# Patient Record
Sex: FEMALE | Race: WHITE | HISPANIC OR LATINO
[De-identification: names, ages, dates, MRNs, and addresses within clinical notes are randomized per-mention and may not be internally consistent; named-entity substitution may affect disease eponyms.]

---

## 2024-02-19 ENCOUNTER — NON-APPOINTMENT (OUTPATIENT)
Age: 69
End: 2024-02-19

## 2024-02-20 ENCOUNTER — APPOINTMENT (OUTPATIENT)
Dept: OTOLARYNGOLOGY | Facility: CLINIC | Age: 69
End: 2024-02-20
Payer: MEDICARE

## 2024-02-20 VITALS
HEART RATE: 66 BPM | BODY MASS INDEX: 27.31 KG/M2 | SYSTOLIC BLOOD PRESSURE: 132 MMHG | DIASTOLIC BLOOD PRESSURE: 91 MMHG | HEIGHT: 64 IN | WEIGHT: 160 LBS | OXYGEN SATURATION: 98 %

## 2024-02-20 DIAGNOSIS — Z78.9 OTHER SPECIFIED HEALTH STATUS: ICD-10-CM

## 2024-02-20 DIAGNOSIS — J34.89 OTHER SPECIFIED DISORDERS OF NOSE AND NASAL SINUSES: ICD-10-CM

## 2024-02-20 PROBLEM — Z00.00 ENCOUNTER FOR PREVENTIVE HEALTH EXAMINATION: Status: ACTIVE | Noted: 2024-02-20

## 2024-02-20 PROCEDURE — 99203 OFFICE O/P NEW LOW 30 MIN: CPT | Mod: 25

## 2024-02-20 PROCEDURE — 31231 NASAL ENDOSCOPY DX: CPT

## 2024-02-20 RX ORDER — ROSUVASTATIN CALCIUM 5 MG/1
5 TABLET, FILM COATED ORAL
Refills: 0 | Status: ACTIVE | COMMUNITY

## 2024-02-20 RX ORDER — CAMPHOR, MENTHOL, WHITE PETROLATUM 1; .54; 97.92 G/100G; G/100G; G/100G
1-0.54-97.92 OINTMENT TOPICAL
Qty: 1 | Refills: 5 | Status: ACTIVE | COMMUNITY
Start: 2024-02-20 | End: 1900-01-01

## 2024-02-20 RX ORDER — CYCLOSPORINE 0.5 MG/ML
EMULSION OPHTHALMIC
Refills: 0 | Status: ACTIVE | COMMUNITY

## 2024-02-20 NOTE — ASSESSMENT
[FreeTextEntry1] : 68F here for initial evaluation. For the past 3 months she c/o feeling discomfort and 'irritation' with 'burning' inside her nostrils. She also feels a growth in the right side which she can pick out w her fingers until it recurs a few days later. There is no pain or bleeding, no drainage or foul odor. Sx are worse in the cold air. Of note, she had rhinoplasty 40yrs ago and has congestion at baseline. She is not on any nasal sprays or ointments. On exam, she is s/p rhinoplasty w narrow external nasal valves. 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 Sx likely due to local nasal vestibulitis. Optimize nasal hygiene w humidification. Can try saline sprays and boroleum ointment. RTO should sx persist/worsen.

## 2024-02-20 NOTE — PROCEDURE
[FreeTextEntry3] : s/p rhinoplasty w narrow valves  Nasal Endoscopy: septum intact scattered mucus/crust removed synechiae in right nasal vestibule no mucosal masses/lesions nasal airways patent, no masses/lesions choana clear

## 2024-02-20 NOTE — CONSULT LETTER
[Dear  ___] : Dear  [unfilled], [Courtesy Letter:] : I had the pleasure of seeing your patient, [unfilled], in my office today. [Consult Closing:] : Thank you very much for allowing me to participate in the care of this patient.  If you have any questions, please do not hesitate to contact me. [Sincerely,] : Sincerely, [FreeTextEntry3] : Christopher Plummer MD Department of Otolaryngology, Head and Neck Surgery

## 2024-02-20 NOTE — HISTORY OF PRESENT ILLNESS
[de-identified] : 68F here for initial evaluation.  For the past 3 months she c/o feeling discomfort and 'irritation' with 'burning' inside her nostrils. She also feels a growth in the right side which she can pick out w her fingers until it recurs a few days later. There is no pain or bleeding, no drainage or foul odor. Sx are worse in the cold air. She had rhinoplasty 40yrs ago and has congestion at baseline.  She is not on sprays or ointments.  ROS otherwise unremarkable.